# Patient Record
Sex: FEMALE | Race: WHITE | NOT HISPANIC OR LATINO | Employment: UNEMPLOYED | URBAN - METROPOLITAN AREA
[De-identification: names, ages, dates, MRNs, and addresses within clinical notes are randomized per-mention and may not be internally consistent; named-entity substitution may affect disease eponyms.]

---

## 2018-11-07 ENCOUNTER — HOSPITAL ENCOUNTER (OUTPATIENT)
Dept: RADIOLOGY | Facility: HOSPITAL | Age: 43
Discharge: HOME | End: 2018-11-07
Attending: FAMILY MEDICINE
Payer: COMMERCIAL

## 2018-11-07 DIAGNOSIS — M79.601 ARM PAIN, RIGHT: ICD-10-CM

## 2018-11-07 DIAGNOSIS — M54.2 NECK PAIN: ICD-10-CM

## 2018-11-07 DIAGNOSIS — F17.200 SMOKER: ICD-10-CM

## 2018-11-07 DIAGNOSIS — M54.2 NECK PAIN: Primary | ICD-10-CM

## 2018-11-07 PROCEDURE — 71046 X-RAY EXAM CHEST 2 VIEWS: CPT | Mod: TC,FY

## 2018-11-07 PROCEDURE — 71046 X-RAY EXAM CHEST 2 VIEWS: CPT | Mod: 26,,, | Performed by: RADIOLOGY

## 2018-11-07 PROCEDURE — 72050 X-RAY EXAM NECK SPINE 4/5VWS: CPT | Mod: TC,FY

## 2018-11-07 PROCEDURE — 72050 X-RAY EXAM NECK SPINE 4/5VWS: CPT | Mod: 26,,, | Performed by: RADIOLOGY

## 2019-04-15 ENCOUNTER — HOSPITAL ENCOUNTER (EMERGENCY)
Facility: HOSPITAL | Age: 44
Discharge: HOME | End: 2019-04-15
Attending: EMERGENCY MEDICINE
Payer: COMMERCIAL

## 2019-04-15 VITALS
WEIGHT: 117 LBS | RESPIRATION RATE: 14 BRPM | HEART RATE: 58 BPM | DIASTOLIC BLOOD PRESSURE: 92 MMHG | HEIGHT: 60 IN | OXYGEN SATURATION: 92 % | SYSTOLIC BLOOD PRESSURE: 121 MMHG | BODY MASS INDEX: 22.97 KG/M2

## 2019-04-15 DIAGNOSIS — R06.00 DYSPNEA: ICD-10-CM

## 2019-04-15 DIAGNOSIS — J20.9 ACUTE BRONCHITIS, UNSPECIFIED ORGANISM: ICD-10-CM

## 2019-04-15 DIAGNOSIS — F17.200 TOBACCO USE DISORDER: ICD-10-CM

## 2019-04-15 DIAGNOSIS — J98.01 ACUTE BRONCHOSPASM: Primary | ICD-10-CM

## 2019-04-15 PROCEDURE — 94640 AIRWAY INHALATION TREATMENT: CPT

## 2019-04-15 PROCEDURE — 71046 XR CHEST PA AND LATERAL: ICD-10-PCS | Mod: 26,,, | Performed by: RADIOLOGY

## 2019-04-15 PROCEDURE — 94760 N-INVAS EAR/PLS OXIMETRY 1: CPT

## 2019-04-15 PROCEDURE — 63600175 PHARM REV CODE 636 W HCPCS: Performed by: EMERGENCY MEDICINE

## 2019-04-15 PROCEDURE — 71046 X-RAY EXAM CHEST 2 VIEWS: CPT | Mod: TC,FY

## 2019-04-15 PROCEDURE — 99284 EMERGENCY DEPT VISIT MOD MDM: CPT | Mod: 25

## 2019-04-15 PROCEDURE — 71046 X-RAY EXAM CHEST 2 VIEWS: CPT | Mod: 26,,, | Performed by: RADIOLOGY

## 2019-04-15 PROCEDURE — 25000242 PHARM REV CODE 250 ALT 637 W/ HCPCS: Performed by: EMERGENCY MEDICINE

## 2019-04-15 PROCEDURE — 94761 N-INVAS EAR/PLS OXIMETRY MLT: CPT

## 2019-04-15 PROCEDURE — 96372 THER/PROPH/DIAG INJ SC/IM: CPT

## 2019-04-15 RX ORDER — IPRATROPIUM BROMIDE AND ALBUTEROL SULFATE 2.5; .5 MG/3ML; MG/3ML
3 SOLUTION RESPIRATORY (INHALATION)
Status: COMPLETED | OUTPATIENT
Start: 2019-04-15 | End: 2019-04-15

## 2019-04-15 RX ORDER — ALBUTEROL SULFATE 90 UG/1
1-2 AEROSOL, METERED RESPIRATORY (INHALATION) EVERY 6 HOURS PRN
Qty: 1 INHALER | Refills: 0 | Status: SHIPPED | OUTPATIENT
Start: 2019-04-15 | End: 2020-04-14

## 2019-04-15 RX ORDER — DEXAMETHASONE SODIUM PHOSPHATE 100 MG/10ML
10 INJECTION INTRAMUSCULAR; INTRAVENOUS
Status: COMPLETED | OUTPATIENT
Start: 2019-04-15 | End: 2019-04-15

## 2019-04-15 RX ORDER — PREDNISONE 10 MG/1
40 TABLET ORAL DAILY
Qty: 20 TABLET | Refills: 0 | Status: SHIPPED | OUTPATIENT
Start: 2019-04-15 | End: 2019-04-20

## 2019-04-15 RX ORDER — DOXYCYCLINE 100 MG/1
100 CAPSULE ORAL 2 TIMES DAILY
Qty: 14 CAPSULE | Refills: 0 | Status: SHIPPED | OUTPATIENT
Start: 2019-04-15 | End: 2019-04-22

## 2019-04-15 RX ADMIN — DEXAMETHASONE SODIUM PHOSPHATE 10 MG: 10 INJECTION INTRAMUSCULAR; INTRAVENOUS at 02:04

## 2019-04-15 RX ADMIN — IPRATROPIUM BROMIDE AND ALBUTEROL SULFATE 3 ML: .5; 3 SOLUTION RESPIRATORY (INHALATION) at 02:04

## 2019-04-15 RX ADMIN — IPRATROPIUM BROMIDE AND ALBUTEROL SULFATE 3 ML: .5; 3 SOLUTION RESPIRATORY (INHALATION) at 04:04

## 2019-04-15 NOTE — ED TRIAGE NOTES
Pt dx with copd by primary. Obtained non smoking patch for today but continues to smoke . States she is out of breath and tired.

## 2019-04-15 NOTE — DISCHARGE INSTRUCTIONS
Prednisone daily x5  Albuterol 2 puffs every 6 hr  If unimproved in 48 hr then fill prescription for antibiotics and began    Have primary care discussion regarding need of using nebulizer and these nebulized meds

## 2019-04-15 NOTE — ED PROVIDER NOTES
Encounter Date: 4/15/2019       History     Chief Complaint   Patient presents with    diff breathing     44-year-old female complains of acute shortness of breath  She reports progressive shortness of breath and increased work of breathing associated with increased tobacco use and mud riding over the weekend with irvin environment exposure  In the past month she has lost a relative, as well as a family pet - the stressors contributing to her increasing tobacco use  She has used an inhaler only on occasion in the past  No fever  Does report a cough productive of sputum  No hemoptysis  No acute chest pain on inspiration         Review of patient's allergies indicates:   Allergen Reactions    Penicillins      Past Medical History:   Diagnosis Date    Arthritis     COPD (chronic obstructive pulmonary disease)      Past Surgical History:   Procedure Laterality Date    APPENDECTOMY      CHOLECYSTECTOMY      HYSTERECTOMY       History reviewed. No pertinent family history.  Social History     Tobacco Use    Smoking status: Current Every Day Smoker    Smokeless tobacco: Never Used   Substance Use Topics    Alcohol use: Yes     Comment: occ     Drug use: Not Currently     Review of Systems   Constitutional: Negative.    HENT: Positive for congestion, rhinorrhea, sneezing and voice change.    Respiratory: Positive for cough, chest tightness, shortness of breath and wheezing.    Cardiovascular: Negative for chest pain, palpitations and leg swelling.   Gastrointestinal: Negative.    Musculoskeletal: Negative.    Skin: Negative.    Hematological: Negative.    All other systems reviewed and are negative.      Physical Exam     Initial Vitals [04/15/19 1409]   BP Pulse Resp Temp SpO2   (!) 150/117 70 18 -- 98 %      MAP       --         Physical Exam    Nursing note and vitals reviewed.  Constitutional: She appears well-developed and well-nourished. She is not diaphoretic. No distress.   HENT:   Head: Normocephalic and  atraumatic.   Nose: Nose normal.   Mouth/Throat: Oropharynx is clear and moist. No oropharyngeal exudate.   Eyes: Conjunctivae and EOM are normal. Pupils are equal, round, and reactive to light. Right eye exhibits no discharge. Left eye exhibits no discharge. No scleral icterus.   Neck: Normal range of motion. Neck supple.   Cardiovascular: Normal rate, regular rhythm, normal heart sounds and intact distal pulses. Exam reveals no gallop and no friction rub.    No murmur heard.  Pulmonary/Chest: No respiratory distress. She has wheezes. She has no rhonchi. She has no rales. She exhibits no tenderness.   Abdominal: Soft. Bowel sounds are normal. She exhibits no distension and no mass. There is no tenderness. There is no rebound and no guarding.   Musculoskeletal: Normal range of motion. She exhibits no edema or tenderness.   Lymphadenopathy:     She has no cervical adenopathy.   Neurological: She is alert and oriented to person, place, and time. She has normal strength. No cranial nerve deficit or sensory deficit.   Skin: Skin is warm and dry. Capillary refill takes less than 2 seconds. No rash noted. No erythema. No pallor.   Psychiatric: She has a normal mood and affect. Her behavior is normal. Judgment and thought content normal.         ED Course   Procedures  Labs Reviewed - No data to display       Imaging Results    None       Imaging Results          X-Ray Chest PA And Lateral (Final result)  Result time 04/15/19 16:15:29    Final result by Ciro Zee MD (04/15/19 16:15:29)                 Impression:      1. Mild pulmonary hyperinflation without focal consolidation.  2. Mild dextroscoliosis.      Electronically signed by: Ciro Zee  Date:    04/15/2019  Time:    16:15             Narrative:    EXAMINATION:  XR CHEST PA AND LATERAL    CLINICAL HISTORY:  Dyspnea, unspecified    TECHNIQUE:  PA and lateral views of the chest were performed.    COMPARISON:  Chest x-ray 11/07/2018.    FINDINGS:  There  is mild pulmonary hyperinflation.The lungs are clear, with normal appearance of pulmonary vasculature and no pleural effusion or pneumothorax.    The cardiac silhouette is normal in size. The hilar and mediastinal contours are unremarkable.    Bones are intact. Mild dextroscoliosis.                                X-Rays:   Independently Interpreted Readings:   Chest X-Ray: Normal heart size.  No infiltrates.  No acute abnormalities.     Medical Decision Making:   Clinical Tests:   Radiological Study: Ordered and Reviewed  ED Management:  Acute bronchitis with bronchospasm    Patient reports intermittent production of cough productive yellow sputum and occ brown sputum  Given the degree of bronchospasm in this report of purulent sputum I have recommended that she initiate treatment steroids and bronchodilators and if unimproved 48 hr then she may begin antibiotic therapy to treat for bronchitis and atypical organisms    She understands this plan of care she has had 2 treatments in the emergency department much improved  She is stable for discharge at this time                      Clinical Impression:       ICD-10-CM ICD-9-CM   1. Acute bronchospasm J98.01 519.11   2. Dyspnea R06.00 786.09   3. Tobacco use disorder F17.200 305.1   4. Acute bronchitis, unspecified organism J20.9 466.0         Disposition:   Disposition: Discharged  Condition: Stable                        Tom Ferrera MD  04/15/19 [id number]

## 2019-10-16 DIAGNOSIS — J44.9 COPD (CHRONIC OBSTRUCTIVE PULMONARY DISEASE): Primary | ICD-10-CM

## 2019-10-16 DIAGNOSIS — R06.2 WHEEZING: ICD-10-CM

## 2019-10-23 ENCOUNTER — PROCEDURE VISIT (OUTPATIENT)
Dept: RESPIRATORY THERAPY | Facility: HOSPITAL | Age: 44
End: 2019-10-23
Attending: FAMILY MEDICINE
Payer: COMMERCIAL

## 2019-10-23 DIAGNOSIS — J44.9 COPD (CHRONIC OBSTRUCTIVE PULMONARY DISEASE): ICD-10-CM

## 2019-10-23 DIAGNOSIS — R06.2 WHEEZING: ICD-10-CM

## 2019-10-23 PROCEDURE — 94760 N-INVAS EAR/PLS OXIMETRY 1: CPT

## 2019-10-23 PROCEDURE — 94060 EVALUATION OF WHEEZING: CPT

## 2019-10-23 PROCEDURE — 25000242 PHARM REV CODE 250 ALT 637 W/ HCPCS

## 2019-10-23 PROCEDURE — 94729 DIFFUSING CAPACITY: CPT

## 2019-10-23 PROCEDURE — 94727 GAS DIL/WSHOT DETER LNG VOL: CPT

## 2019-10-23 RX ORDER — ALBUTEROL SULFATE 0.83 MG/ML
2.5 SOLUTION RESPIRATORY (INHALATION) ONCE
Status: COMPLETED | OUTPATIENT
Start: 2019-10-23 | End: 2019-10-23

## 2019-10-23 RX ADMIN — ALBUTEROL SULFATE 2.5 MG: 2.5 SOLUTION RESPIRATORY (INHALATION) at 10:10

## 2019-10-24 LAB
BRPFT: ABNORMAL
DLCO ADJ PRE: 12.67 ML/(MIN*MMHG) (ref 16.63–28.09)
DLCO SINGLE BREATH LLN: 16.63
DLCO SINGLE BREATH PRE REF: 56.7 %
DLCO SINGLE BREATH REF: 22.36
DLCOC SBVA LLN: 3.44
DLCOC SBVA PRE REF: 65.2 %
DLCOC SBVA REF: 5.27
DLCOC SINGLE BREATH LLN: 16.63
DLCOC SINGLE BREATH PRE REF: 56.7 %
DLCOC SINGLE BREATH REF: 22.36
DLCOVA LLN: 3.44
DLCOVA PRE REF: 65.2 %
DLCOVA PRE: 3.44 ML/(MIN*MMHG*L) (ref 3.44–7.1)
DLCOVA REF: 5.27
DLVAADJ PRE: 3.44 ML/(MIN*MMHG*L) (ref 3.44–7.1)
ERVN2 LLN: 0.98
ERVN2 PRE REF: 55.1 %
ERVN2 PRE: 0.54 L (ref 0.98–0.98)
ERVN2 REF: 0.98
FEF 25 75 CHG: 2.2 %
FEF 25 75 LLN: 1.55
FEF 25 75 POST REF: 47.1 %
FEF 25 75 PRE REF: 46.1 %
FEF 25 75 REF: 2.66
FET100 CHG: -8.2 %
FEV1 CHG: -5.4 %
FEV1 FVC CHG: 1.4 %
FEV1 FVC LLN: 71
FEV1 FVC POST REF: 85.9 %
FEV1 FVC PRE REF: 84.7 %
FEV1 FVC REF: 82
FEV1 LLN: 1.96
FEV1 POST REF: 78.9 %
FEV1 PRE REF: 83.4 %
FEV1 REF: 2.49
FRCN2 LLN: 1.63
FRCN2 PRE REF: 102.9 %
FRCN2 REF: 2.45
FVC CHG: -6.7 %
FVC LLN: 2.42
FVC POST REF: 91.6 %
FVC PRE REF: 98.2 %
FVC REF: 3.06
IVC PRE: 2.19 L (ref 2.42–3.7)
IVC SINGLE BREATH LLN: 2.42
IVC SINGLE BREATH PRE REF: 71.7 %
IVC SINGLE BREATH REF: 3.06
MVV LLN: 77
MVV PRE REF: 74.6 %
MVV REF: 91
PEF CHG: -0.1 %
PEF LLN: 4.76
PEF POST REF: 89.8 %
PEF PRE REF: 89.9 %
PEF REF: 6.26
POST FEF 25 75: 1.25 L/S (ref 1.55–3.77)
POST FET 100: 9.87 SEC
POST FEV1 FVC: 70.24 % (ref 70.7–92.84)
POST FEV1: 1.97 L (ref 1.96–3.02)
POST FVC: 2.8 L (ref 2.42–3.7)
POST PEF: 5.62 L/S (ref 4.76–7.76)
PRE DLCO: 12.67 ML/(MIN*MMHG) (ref 16.63–28.09)
PRE FEF 25 75: 1.23 L/S (ref 1.55–3.77)
PRE FET 100: 10.75 SEC
PRE FEV1 FVC: 69.28 % (ref 70.7–92.84)
PRE FEV1: 2.08 L (ref 1.96–3.02)
PRE FRC N2: 2.52 L
PRE FVC: 3 L (ref 2.42–3.7)
PRE MVV: 68 L/MIN (ref 77.49–104.83)
PRE PEF: 5.63 L/S (ref 4.76–7.76)
RVN2 LLN: 0.9
RVN2 PRE REF: 96.5 %
RVN2 PRE: 1.42 L (ref 0.9–2.05)
RVN2 REF: 1.47
RVN2TLCN2 LLN: 24.67
RVN2TLCN2 PRE REF: 93.8 %
RVN2TLCN2 PRE: 32.12 % (ref 24.67–43.85)
RVN2TLCN2 REF: 34.26
TLCN2 LLN: 3.25
TLCN2 PRE REF: 104.2 %
TLCN2 PRE: 4.42 L (ref 3.25–5.23)
TLCN2 REF: 4.24
VA PRE: 3.69 L (ref 4.09–4.09)
VA SINGLE BREATH LLN: 4.09
VA SINGLE BREATH PRE REF: 90.2 %
VA SINGLE BREATH REF: 4.09
VCMAXN2 LLN: 2.42
VCMAXN2 PRE REF: 98.2 %
VCMAXN2 PRE: 3 L (ref 2.42–3.7)
VCMAXN2 REF: 3.06

## 2024-11-01 ENCOUNTER — OFFICE VISIT (OUTPATIENT)
Dept: URGENT CARE | Facility: CLINIC | Age: 49
End: 2024-11-01
Payer: COMMERCIAL

## 2024-11-01 VITALS
SYSTOLIC BLOOD PRESSURE: 123 MMHG | HEIGHT: 62 IN | HEART RATE: 83 BPM | DIASTOLIC BLOOD PRESSURE: 72 MMHG | WEIGHT: 96 LBS | OXYGEN SATURATION: 97 % | RESPIRATION RATE: 20 BRPM | BODY MASS INDEX: 17.66 KG/M2 | TEMPERATURE: 99 F

## 2024-11-01 DIAGNOSIS — J44.9 CHRONIC OBSTRUCTIVE PULMONARY DISEASE, UNSPECIFIED COPD TYPE: ICD-10-CM

## 2024-11-01 DIAGNOSIS — J02.9 SORE THROAT: ICD-10-CM

## 2024-11-01 DIAGNOSIS — J40 BRONCHITIS: Primary | ICD-10-CM

## 2024-11-01 DIAGNOSIS — R05.9 COUGH, UNSPECIFIED TYPE: ICD-10-CM

## 2024-11-01 LAB
CTP QC/QA: YES
MOLECULAR STREP A: NEGATIVE

## 2024-11-01 RX ORDER — BENZONATATE 200 MG/1
200 CAPSULE ORAL 3 TIMES DAILY PRN
Qty: 21 CAPSULE | Refills: 0 | Status: SHIPPED | OUTPATIENT
Start: 2024-11-01 | End: 2024-11-11

## 2024-11-01 RX ORDER — METHYLPREDNISOLONE 4 MG/1
TABLET ORAL
Qty: 21 EACH | Refills: 0 | Status: SHIPPED | OUTPATIENT
Start: 2024-11-01 | End: 2024-11-22

## 2024-11-01 RX ORDER — HYDROCODONE BITARTRATE AND ACETAMINOPHEN 10; 325 MG/1; MG/1
1 TABLET ORAL EVERY 8 HOURS PRN
COMMUNITY
Start: 2024-10-01

## 2024-11-01 RX ORDER — FLUTICASONE FUROATE, UMECLIDINIUM BROMIDE AND VILANTEROL TRIFENATATE 100; 62.5; 25 UG/1; UG/1; UG/1
1 POWDER RESPIRATORY (INHALATION)
COMMUNITY
Start: 2024-11-01